# Patient Record
Sex: FEMALE | Race: ASIAN | Employment: FULL TIME | URBAN - METROPOLITAN AREA
[De-identification: names, ages, dates, MRNs, and addresses within clinical notes are randomized per-mention and may not be internally consistent; named-entity substitution may affect disease eponyms.]

---

## 2021-12-09 ENCOUNTER — HOSPITAL ENCOUNTER (EMERGENCY)
Age: 56
Discharge: HOME | End: 2021-12-09
Attending: STUDENT IN AN ORGANIZED HEALTH CARE EDUCATION/TRAINING PROGRAM
Payer: COMMERCIAL

## 2021-12-09 ENCOUNTER — APPOINTMENT (OUTPATIENT)
Dept: CT IMAGING | Age: 56
End: 2021-12-09
Attending: STUDENT IN AN ORGANIZED HEALTH CARE EDUCATION/TRAINING PROGRAM
Payer: COMMERCIAL

## 2021-12-09 VITALS
OXYGEN SATURATION: 98 % | DIASTOLIC BLOOD PRESSURE: 88 MMHG | TEMPERATURE: 98 F | RESPIRATION RATE: 18 BRPM | WEIGHT: 113 LBS | SYSTOLIC BLOOD PRESSURE: 142 MMHG | HEART RATE: 72 BPM

## 2021-12-09 DIAGNOSIS — R11.2 NON-INTRACTABLE VOMITING WITH NAUSEA, UNSPECIFIED VOMITING TYPE: ICD-10-CM

## 2021-12-09 DIAGNOSIS — R10.31 ABDOMINAL PAIN, RLQ: Primary | ICD-10-CM

## 2021-12-09 LAB
COMMENT, HOLDF: NORMAL
SAMPLES BEING HELD,HOLD: NORMAL

## 2021-12-09 PROCEDURE — 74011000636 HC RX REV CODE- 636: Performed by: STUDENT IN AN ORGANIZED HEALTH CARE EDUCATION/TRAINING PROGRAM

## 2021-12-09 PROCEDURE — 36415 COLL VENOUS BLD VENIPUNCTURE: CPT

## 2021-12-09 PROCEDURE — 74177 CT ABD & PELVIS W/CONTRAST: CPT

## 2021-12-09 PROCEDURE — 99283 EMERGENCY DEPT VISIT LOW MDM: CPT

## 2021-12-09 RX ORDER — NAPROXEN 500 MG/1
500 TABLET ORAL
Qty: 20 TABLET | Refills: 0 | Status: SHIPPED | OUTPATIENT
Start: 2021-12-09 | End: 2021-12-19

## 2021-12-09 RX ORDER — ONDANSETRON 4 MG/1
4 TABLET, ORALLY DISINTEGRATING ORAL
Qty: 20 TABLET | Refills: 0 | Status: SHIPPED | OUTPATIENT
Start: 2021-12-09

## 2021-12-09 RX ADMIN — IOPAMIDOL 100 ML: 755 INJECTION, SOLUTION INTRAVENOUS at 21:38

## 2021-12-09 NOTE — Clinical Note
[facility]  [address]  [address]  [phone]    Work/School Note    Date: 12/9/2021    To Whom It May concern:    Jelani Carlson was seen and treated today in the emergency room by the following provider(s):  Attending Provider: Parker Costa MD.      Jelani Carlson is excused from work/school on 12/09/21 and 12/10/21. She is medically clear to return to work/school on 12/11/2021. Sincerely,          Devan Echavarria MD

## 2021-12-10 NOTE — DISCHARGE INSTRUCTIONS
You presented to the ED from urgent care with right lower quadrant pain. Your lab work-up from urgent care did not show any acute or concerning findings. However your pain was consistent with appendicitis. A CT scan with contrast was obtained that showed no appendicitis, kidney stones, diverticulitis or other acute pathology. I recommend following up with your primary care physician, if you not have one use information discharge paperwork to obtain one.   Additionally if your pain continues please follow-up with gastroenterology

## 2021-12-10 NOTE — ED PROVIDER NOTES
Patient is a 35-year-old female who requires ASL presenting to the ED from urgent care with concern for right lower quadrant pain and appendicitis. Patient's lab work from urgent care reassuring. Kidney function within normal limits. CT scan of abdomen pelvis ordered with contrast.    Patient Dors is pain intermittently present for 30 years, patient did have some distant gynecologic problem that resolved and allowed her to have children afterwards. Patient had some nausea and felt poorly yesterday but all of the symptoms have resolved and she feels much better. Pain is now gone. Patient states she had normal bowel movements daily with no constipation. Denies dysuria. The history is provided by the patient, the spouse and medical records. The history is limited by a language barrier. A  was used. Abdominal Pain   This is a new problem. The current episode started 12 to 24 hours ago. The problem occurs constantly. The problem has been resolved. The pain is associated with an unknown factor. The pain is located in the RLQ. The quality of the pain is aching, pressure-like and sharp. The pain is at a severity of 6/10. The pain is moderate. Associated symptoms include nausea and vomiting. Pertinent negatives include no diarrhea, no constipation, no dysuria and no frequency. The pain is worsened by palpation. The pain is relieved by nothing. Past Medical History:   Diagnosis Date    Fallopian tube disorder     Kidney stones        History reviewed. No pertinent surgical history. History reviewed. No pertinent family history.     Social History     Socioeconomic History    Marital status: SINGLE     Spouse name: Not on file    Number of children: Not on file    Years of education: Not on file    Highest education level: Not on file   Occupational History    Not on file   Tobacco Use    Smoking status: Never Smoker    Smokeless tobacco: Never Used   Substance and Sexual Activity    Alcohol use: Never    Drug use: Never    Sexual activity: Not on file   Other Topics Concern    Not on file   Social History Narrative    Not on file     Social Determinants of Health     Financial Resource Strain:     Difficulty of Paying Living Expenses: Not on file   Food Insecurity:     Worried About Running Out of Food in the Last Year: Not on file    Yanelis of Food in the Last Year: Not on file   Transportation Needs:     Lack of Transportation (Medical): Not on file    Lack of Transportation (Non-Medical): Not on file   Physical Activity:     Days of Exercise per Week: Not on file    Minutes of Exercise per Session: Not on file   Stress:     Feeling of Stress : Not on file   Social Connections:     Frequency of Communication with Friends and Family: Not on file    Frequency of Social Gatherings with Friends and Family: Not on file    Attends [employer] Services: Not on file    Active Member of [address] [place] [address] [employer] or Organizations: Not on file    Attends Club or Organization Meetings: Not on file    Marital Status: Not on file   Intimate Partner Violence:     Fear of Current or Ex-Partner: Not on file    Emotionally Abused: Not on file    Physically Abused: Not on file    Sexually Abused: Not on file   Housing Stability:     Unable to Pay for Housing in the Last Year: Not on file    Number of [place] in the Last Year: Not on file    Unstable Housing in the Last Year: Not on file         ALLERGIES: Patient has no known allergies. Review of Systems   Constitutional: Negative. HENT: Negative. Eyes: Negative. Respiratory: Negative. Cardiovascular: Negative. Gastrointestinal: Positive for abdominal pain, nausea and vomiting. Negative for constipation and diarrhea. Endocrine: Negative. Genitourinary: Negative. Negative for dysuria and frequency. Musculoskeletal: Negative. Skin: Negative. Allergic/Immunologic: Negative. Neurological: Negative. Hematological: Negative. Psychiatric/Behavioral: Negative. Vitals:    12/09/21 2024   BP: (!) 152/105   Pulse: 68   Resp: 16   Temp: 97.8 °F (36.6 °C)   SpO2: 97%   Weight: 51.3 kg (113 lb)            Physical Exam  Vitals and nursing note reviewed. Constitutional:       General: She is not in acute distress. Appearance: Normal appearance. HENT:      Head: Normocephalic and atraumatic. Right Ear: External ear normal.      Left Ear: External ear normal.      Nose: Nose normal.   Eyes:      Extraocular Movements: Extraocular movements intact. Conjunctiva/sclera: Conjunctivae normal.   Cardiovascular:      Rate and Rhythm: Normal rate. Pulses: Normal pulses. Radial pulses are 2+ on the right side and 2+ on the left side. Heart sounds: Normal heart sounds. Pulmonary:      Effort: Pulmonary effort is normal.      Breath sounds: Normal breath sounds. Chest:      Chest wall: No deformity or swelling. Abdominal:      General: Abdomen is flat. Bowel sounds are normal. There is no distension. Palpations: Abdomen is soft. Tenderness: There is no abdominal tenderness. There is no right CVA tenderness, left CVA tenderness, guarding or rebound. Negative signs include Hardy's sign, Rovsing's sign and McBurney's sign. Hernia: No hernia is present. Musculoskeletal:         General: No deformity or signs of injury. Normal range of motion. Cervical back: Normal range of motion and neck supple. No tenderness. Skin:     General: Skin is warm and dry. Capillary Refill: Capillary refill takes less than 2 seconds. Neurological:      General: No focal deficit present. Mental Status: She is alert and oriented to person, place, and time.    Psychiatric:         Attention and Perception: Attention normal.         Mood and Affect: Mood normal.         Behavior: Behavior normal.          MDM     LABORATORY RESULTS:  Labs Reviewed   SAMPLES BEING HELD SAMPLES BEING HELD           IMAGING RESULTS:  CT ABD PELV W CONT   Final Result      1. No evidence of acute process in the abdomen or pelvis. Normal appendix. 2. Moderate volume of stool throughout the colon, query constipation. Distended   stomach full of debris. 3. Hepatic steatosis. 4. Additional findings as above. MEDICATIONS GIVEN:  Medications   iopamidoL (ISOVUE-370) 76 % injection 100 mL (100 mL IntraVENous Given 12/9/21 2138)       Differential diagnosis: Appendicitis, constipation, adnexal pathology, nausea vomiting    ED physician interpretation of laboratory results: Outpatient lab work unremarkable. Labs within normal limits. No lab work indicated because of patient's now resolved abdominal pain    MDM: Patient is a 57-year-old female presented to ED with acute right lower quadrant pain with unremarkable CT scan and lab work with resolved pain appropriate for discharge home and outpatient follow-up. Prescription for naproxen and Zofran given. Patient's vital signs are stable, patient is afebrile. Patient's physical exam is unremarkable. Key discharge instructions and summary of care: You presented to the ED from urgent care with right lower quadrant pain. Your lab work-up from urgent care did not show any acute or concerning findings. However your pain was consistent with appendicitis. A CT scan with contrast was obtained that showed no appendicitis, kidney stones, diverticulitis or other acute pathology. I recommend following up with your primary care physician, if you not have one use information discharge paperwork to obtain one. Additionally if your pain continues please follow-up with gastroenterology    The patient has been re-evaluated and feeling better. Patient is stable for discharge. All available radiology and laboratory results have been reviewed with patient and/or available family.   Patient and/or family verbally conveyed their understanding and agreement of the patient's signs, symptoms, diagnosis, treatment and prognosis and additionally agree to follow-up as recommended in the discharge instructions or to return to the Emergency Department should their condition change or worsen prior to their follow-up appointment. All questions have been answered and patient and/or available family express understanding. IMPRESSION:  1. Abdominal pain, RLQ    2. Non-intractable vomiting with nausea, unspecified vomiting type        DISPOSITION: Discharged    René Edgar MD        Procedures

## 2021-12-10 NOTE — ED TRIAGE NOTES
Pt c/o RLQ pain x 30 years and worse the last 7 years with no diagnosis. Pt states that pain makes her dizzy and nausea. Pt states that she had an xray and bloodwork done today at [facility]. Pt has hx of kidney stones, but none currently. Pt also has hx of issues with her right ovary.

## 2023-05-15 RX ORDER — ONDANSETRON 4 MG/1
4 TABLET, ORALLY DISINTEGRATING ORAL EVERY 8 HOURS PRN
COMMUNITY
Start: 2021-12-09

## 2024-03-19 ENCOUNTER — NURSE TRIAGE (OUTPATIENT)
Dept: OTHER | Facility: CLINIC | Age: 59
End: 2024-03-19

## 2024-03-19 NOTE — TELEPHONE ENCOUNTER
[place]    Received call from Marcy[place] at [facility]; Patient with Red Flag Complaint requesting to establish care with Riggschriss Burns. Patient states she had a new patient appointment scheduled for June but received a call that it was gong to be pushed back until August.     Call transferred with a  already on the line ID #[id number]    Subjective: Caller states \"high blood pressure\"     Current Symptoms: /109 this morning, during the call 152/100. Patient states SBP is sometimes above 200.Right arm tingling beginning 1 month ago.     Onset: 1 month ago;     Pain Severity: 0/10;     Temperature: denies    What has been tried: nothing    LMP: Pregnant: No    Recommended disposition: Go to ED Now    Care advice provided, patient verbalizes understanding; denies any other questions or concerns; instructed to call back for any new or worsening symptoms.    Patient declines ED disposition and wants to establish with PCP. Disposition reinforced. Unable to transfer patient to [facility] for scheduling, currently closed. Patient instructed to call back first thing in the morning to schedule her appointment and inform  she has already spoke with a Triage RN.     Attention Provider:  Thank you for allowing me to participate in the care of your patient.  The patient was connected to triage in response to information provided to the [facility].  Please do not respond through this encounter as the response is not directed to a shared pool.      Reason for Disposition   [1] Systolic BP  >= 160 OR Diastolic >= 100 AND [2] cardiac (e.g., breathing difficulty, chest pain) or neurologic symptoms (e.g., new-onset blurred or double vision, unsteady gait)    Protocols used: Blood Pressure - High-ADULT-

## 2024-07-11 ENCOUNTER — OFFICE VISIT (OUTPATIENT)
Age: 59
End: 2024-07-11
Payer: COMMERCIAL

## 2024-07-11 VITALS
OXYGEN SATURATION: 98 % | TEMPERATURE: 97.8 F | HEIGHT: 62 IN | BODY MASS INDEX: 20.61 KG/M2 | WEIGHT: 112 LBS | DIASTOLIC BLOOD PRESSURE: 84 MMHG | SYSTOLIC BLOOD PRESSURE: 150 MMHG | HEART RATE: 61 BPM

## 2024-07-11 DIAGNOSIS — Z11.59 ENCOUNTER FOR HEPATITIS C SCREENING TEST FOR LOW RISK PATIENT: ICD-10-CM

## 2024-07-11 DIAGNOSIS — G89.29 CHRONIC RIGHT SHOULDER PAIN: ICD-10-CM

## 2024-07-11 DIAGNOSIS — Z12.12 SCREENING FOR COLORECTAL CANCER: ICD-10-CM

## 2024-07-11 DIAGNOSIS — Z13.220 SCREENING CHOLESTEROL LEVEL: ICD-10-CM

## 2024-07-11 DIAGNOSIS — Z11.4 SCREENING FOR HIV (HUMAN IMMUNODEFICIENCY VIRUS): ICD-10-CM

## 2024-07-11 DIAGNOSIS — Z12.31 ENCOUNTER FOR SCREENING MAMMOGRAM FOR BREAST CANCER: ICD-10-CM

## 2024-07-11 DIAGNOSIS — I10 ESSENTIAL HYPERTENSION: ICD-10-CM

## 2024-07-11 DIAGNOSIS — E04.1 THYROID NODULE: ICD-10-CM

## 2024-07-11 DIAGNOSIS — Z12.4 CERVICAL CANCER SCREENING: ICD-10-CM

## 2024-07-11 DIAGNOSIS — M25.511 CHRONIC RIGHT SHOULDER PAIN: ICD-10-CM

## 2024-07-11 DIAGNOSIS — R01.1 MURMUR, CARDIAC: ICD-10-CM

## 2024-07-11 DIAGNOSIS — Z13.1 SCREENING FOR DIABETES MELLITUS: ICD-10-CM

## 2024-07-11 DIAGNOSIS — Z12.11 SCREENING FOR COLORECTAL CANCER: ICD-10-CM

## 2024-07-11 DIAGNOSIS — Z76.89 ESTABLISHING CARE WITH NEW DOCTOR, ENCOUNTER FOR: Primary | ICD-10-CM

## 2024-07-11 DIAGNOSIS — H91.93 BILATERAL DEAFNESS: ICD-10-CM

## 2024-07-11 PROBLEM — R20.0 NUMBNESS OF UPPER LIMB: Status: RESOLVED | Noted: 2024-03-20 | Resolved: 2024-07-11

## 2024-07-11 PROBLEM — R20.0 NUMBNESS OF LOWER LIMB: Status: RESOLVED | Noted: 2024-03-20 | Resolved: 2024-07-11

## 2024-07-11 PROCEDURE — 3079F DIAST BP 80-89 MM HG: CPT | Performed by: STUDENT IN AN ORGANIZED HEALTH CARE EDUCATION/TRAINING PROGRAM

## 2024-07-11 PROCEDURE — 3077F SYST BP >= 140 MM HG: CPT | Performed by: STUDENT IN AN ORGANIZED HEALTH CARE EDUCATION/TRAINING PROGRAM

## 2024-07-11 PROCEDURE — 99204 OFFICE O/P NEW MOD 45 MIN: CPT | Performed by: STUDENT IN AN ORGANIZED HEALTH CARE EDUCATION/TRAINING PROGRAM

## 2024-07-11 RX ORDER — LISINOPRIL 10 MG/1
10 TABLET ORAL DAILY
COMMUNITY
Start: 2024-03-20 | End: 2024-07-11 | Stop reason: SINTOL

## 2024-07-11 RX ORDER — AMLODIPINE BESYLATE 5 MG/1
5 TABLET ORAL DAILY
Qty: 90 TABLET | Refills: 0 | Status: SHIPPED | OUTPATIENT
Start: 2024-07-11

## 2024-07-11 SDOH — ECONOMIC STABILITY: FOOD INSECURITY: WITHIN THE PAST 12 MONTHS, YOU WORRIED THAT YOUR FOOD WOULD RUN OUT BEFORE YOU GOT MONEY TO BUY MORE.: NEVER TRUE

## 2024-07-11 SDOH — ECONOMIC STABILITY: HOUSING INSECURITY
IN THE LAST 12 MONTHS, WAS THERE A TIME WHEN YOU DID NOT HAVE A STEADY PLACE TO SLEEP OR SLEPT IN A SHELTER (INCLUDING NOW)?: NO

## 2024-07-11 SDOH — ECONOMIC STABILITY: INCOME INSECURITY: HOW HARD IS IT FOR YOU TO PAY FOR THE VERY BASICS LIKE FOOD, HOUSING, MEDICAL CARE, AND HEATING?: NOT HARD AT ALL

## 2024-07-11 SDOH — ECONOMIC STABILITY: FOOD INSECURITY: WITHIN THE PAST 12 MONTHS, THE FOOD YOU BOUGHT JUST DIDN'T LAST AND YOU DIDN'T HAVE MONEY TO GET MORE.: NEVER TRUE

## 2024-07-11 ASSESSMENT — PATIENT HEALTH QUESTIONNAIRE - PHQ9
SUM OF ALL RESPONSES TO PHQ9 QUESTIONS 1 & 2: 0
SUM OF ALL RESPONSES TO PHQ QUESTIONS 1-9: 0
2. FEELING DOWN, DEPRESSED OR HOPELESS: NOT AT ALL
1. LITTLE INTEREST OR PLEASURE IN DOING THINGS: NOT AT ALL
SUM OF ALL RESPONSES TO PHQ QUESTIONS 1-9: 0

## 2024-07-11 ASSESSMENT — ENCOUNTER SYMPTOMS
SHORTNESS OF BREATH: 0
BLOOD IN STOOL: 0
ABDOMINAL PAIN: 0
DIARRHEA: 0
CONSTIPATION: 0
COUGH: 0
NAUSEA: 0
VOMITING: 0

## 2024-07-11 NOTE — PROGRESS NOTES
RM14    Chief Complaint   Patient presents with    New Patient     Pt stated she has a history of high blood pressure and she was in ER,she also stated she has a Rt shoulder pain for a while .         \"Have you been to the ER, urgent care clinic since your last visit?  Hospitalized since your last visit?\"    NO    “Have you seen or consulted any other health care providers outside of [facility] since your last visit?”    NO    Have you had a mammogram?”   NO    No breast cancer screening on file      “Have you had a pap smear?”    NO    No cervical cancer screening on file         “Have you had a colorectal cancer screening such as a colonoscopy/FIT/Cologuard?    NO    No colonoscopy on file  No cologuard on file  No FIT/FOBT on file   No flexible sigmoidoscopy on file         Click Here for Release of Records Request   AVS  education, follow up, and recommendations provided and addressed with patient.  services used to advise patient yes    
Future  -     Comprehensive Metabolic Panel; Future  -     Lipid Panel; Future  -     Hemoglobin A1C; Future  -     Thyroid Cascade Profile; Future  -     Magnesium; Future  3. Murmur, cardiac  -     Echo (TTE) complete (PRN contrast/bubble/strain/3D); Future  -     [facility] - Serge Steen MD, Interventional Cardiology, [place] ([facility])  4. Thyroid nodule  -     US HEAD NECK SOFT TISSUE THYROID; Future  5. Chronic right shoulder pain  6. Bilateral deafness  7. Screening for diabetes mellitus  -     Hemoglobin A1C; Future  8. Screening cholesterol level  -     Lipid Panel; Future  9. Screening for HIV (human immunodeficiency virus)  -     HIV 1/2 Ag/Ab, 4TH Generation,W Rflx Confirm; Future  10. Encounter for hepatitis C screening test for low risk patient  -     Hepatitis C Ab, Rflx to Qt by PCR; Future  11. Encounter for screening mammogram for breast cancer  -     Cologuard (Fecal DNA Colorectal Cancer Screening)  12. Screening for colorectal cancer  -     EMILIANO DIGITAL SCREEN W OR WO CAD BILATERAL; Future  13. Cervical cancer screening  -     [facility] - Brenda Tafoya MD, Ob-Gyn, [place]  ([facility])         Patient presents for establish care visit with me.  Acute concerns addressed.  Chronic problems reviewed.  Medications and history reviewed.  See below for additional information.  Hypertension: Chronic and uncontrolled. Patient is not on medication. We will start Amlodipine 5 mg daily. No longer use lisinopril due to cough. Patient is due for labs. Will check labs today (see above). Follow-up in 1 month.  Cardiac murmur: Chronic, unknown cause. Will order for echocardiogram to evaluate  Thyroid nodule: Chronic and unknown status. Will order for US head/neck to evaluate  Chronic R shoulder pain: Uncontrolled - encouraged patient to reach out to orthopedic doctor, neurologist and PT/OT per ER discharge papework  Bilateral deafness: Chronic and stable - ASL  used for duration of visit  Will screen

## 2024-07-12 LAB
ALBUMIN SERPL-MCNC: 4.5 G/DL (ref 3.8–4.9)
ALP SERPL-CCNC: 97 IU/L (ref 44–121)
ALT SERPL-CCNC: 20 IU/L (ref 0–32)
AST SERPL-CCNC: 23 IU/L (ref 0–40)
BASOPHILS # BLD AUTO: 0 X10E3/UL (ref 0–0.2)
BASOPHILS NFR BLD AUTO: 1 %
BILIRUB SERPL-MCNC: 1 MG/DL (ref 0–1.2)
BUN SERPL-MCNC: 21 MG/DL (ref 6–24)
BUN/CREAT SERPL: 27 (ref 9–23)
CALCIUM SERPL-MCNC: 9.2 MG/DL (ref 8.7–10.2)
CHLORIDE SERPL-SCNC: 103 MMOL/L (ref 96–106)
CHOLEST SERPL-MCNC: 212 MG/DL (ref 100–199)
CO2 SERPL-SCNC: 28 MMOL/L (ref 20–29)
CREAT SERPL-MCNC: 0.79 MG/DL (ref 0.57–1)
EOSINOPHIL # BLD AUTO: 0.1 X10E3/UL (ref 0–0.4)
EOSINOPHIL NFR BLD AUTO: 2 %
ERYTHROCYTE [DISTWIDTH] IN BLOOD BY AUTOMATED COUNT: 11.9 % (ref 11.7–15.4)
GLOBULIN SER CALC-MCNC: 2.5 G/DL (ref 1.5–4.5)
GLUCOSE SERPL-MCNC: 81 MG/DL (ref 70–99)
HBA1C MFR BLD: 5.3 % (ref 4.8–5.6)
HCT VFR BLD AUTO: 39.7 % (ref 34–46.6)
HCV AB SERPL QL IA: NORMAL
HCV IGG SERPL QL IA: NON REACTIVE
HDLC SERPL-MCNC: 73 MG/DL
HGB BLD-MCNC: 12.8 G/DL (ref 11.1–15.9)
HIV 1+2 AB+HIV1 P24 AG SERPL QL IA: NON REACTIVE
IMM GRANULOCYTES # BLD AUTO: 0 X10E3/UL (ref 0–0.1)
IMM GRANULOCYTES NFR BLD AUTO: 0 %
LDLC SERPL CALC-MCNC: 116 MG/DL (ref 0–99)
LYMPHOCYTES # BLD AUTO: 1.4 X10E3/UL (ref 0.7–3.1)
LYMPHOCYTES NFR BLD AUTO: 36 %
MAGNESIUM SERPL-MCNC: 2.3 MG/DL (ref 1.6–2.3)
MCH RBC QN AUTO: 29.8 PG (ref 26.6–33)
MCHC RBC AUTO-ENTMCNC: 32.2 G/DL (ref 31.5–35.7)
MCV RBC AUTO: 92 FL (ref 79–97)
MONOCYTES # BLD AUTO: 0.3 X10E3/UL (ref 0.1–0.9)
MONOCYTES NFR BLD AUTO: 7 %
NEUTROPHILS # BLD AUTO: 2.1 X10E3/UL (ref 1.4–7)
NEUTROPHILS NFR BLD AUTO: 54 %
PLATELET # BLD AUTO: 188 X10E3/UL (ref 150–450)
POTASSIUM SERPL-SCNC: 3.7 MMOL/L (ref 3.5–5.2)
PROT SERPL-MCNC: 7 G/DL (ref 6–8.5)
RBC # BLD AUTO: 4.3 X10E6/UL (ref 3.77–5.28)
SODIUM SERPL-SCNC: 143 MMOL/L (ref 134–144)
TRIGL SERPL-MCNC: 133 MG/DL (ref 0–149)
TSH SERPL DL<=0.005 MIU/L-ACNC: 1.73 UIU/ML (ref 0.45–4.5)
VLDLC SERPL CALC-MCNC: 23 MG/DL (ref 5–40)
WBC # BLD AUTO: 3.9 X10E3/UL (ref 3.4–10.8)

## 2024-07-17 ENCOUNTER — TELEPHONE (OUTPATIENT)
Age: 59
End: 2024-07-17

## 2024-07-17 NOTE — TELEPHONE ENCOUNTER
Left a detailed message pertain to the provider referral and provided patient with a call back number

## 2024-08-14 ENCOUNTER — TELEPHONE (OUTPATIENT)
Age: 59
End: 2024-08-14

## 2024-08-14 NOTE — TELEPHONE ENCOUNTER
Called patient to schedule a new patient appointment from the referral received from the patients pcp, left  for the patient to call back.

## 2024-08-15 ENCOUNTER — TELEPHONE (OUTPATIENT)
Age: 59
End: 2024-08-15

## 2024-08-15 NOTE — TELEPHONE ENCOUNTER
Called patient to schedule a new patient appointment with Dr. Steen, voicemail left to return the call.  Appointment can be scheduled by any .     [place]

## 2024-09-23 LAB — NONINV COLON CA DNA+OCC BLD SCRN STL QL: NEGATIVE

## 2024-10-08 ENCOUNTER — OFFICE VISIT (OUTPATIENT)
Age: 59
End: 2024-10-08
Payer: COMMERCIAL

## 2024-10-08 VITALS
HEIGHT: 62 IN | OXYGEN SATURATION: 98 % | SYSTOLIC BLOOD PRESSURE: 112 MMHG | HEART RATE: 54 BPM | WEIGHT: 115 LBS | BODY MASS INDEX: 21.16 KG/M2 | DIASTOLIC BLOOD PRESSURE: 70 MMHG

## 2024-10-08 DIAGNOSIS — H91.93 BILATERAL DEAFNESS: ICD-10-CM

## 2024-10-08 DIAGNOSIS — R01.1 UNDIAGNOSED CARDIAC MURMURS: Primary | ICD-10-CM

## 2024-10-08 DIAGNOSIS — M25.511 CHRONIC RIGHT SHOULDER PAIN: ICD-10-CM

## 2024-10-08 DIAGNOSIS — I49.3 FREQUENT PVCS: ICD-10-CM

## 2024-10-08 DIAGNOSIS — G89.29 CHRONIC RIGHT SHOULDER PAIN: ICD-10-CM

## 2024-10-08 DIAGNOSIS — E04.1 THYROID NODULE: ICD-10-CM

## 2024-10-08 DIAGNOSIS — R00.2 PALPITATIONS: ICD-10-CM

## 2024-10-08 DIAGNOSIS — I10 ESSENTIAL HYPERTENSION: ICD-10-CM

## 2024-10-08 DIAGNOSIS — Z82.49 FAMILY HISTORY OF ISCHEMIC HEART DISEASE (IHD): ICD-10-CM

## 2024-10-08 PROCEDURE — 99204 OFFICE O/P NEW MOD 45 MIN: CPT | Performed by: INTERNAL MEDICINE

## 2024-10-08 PROCEDURE — 93000 ELECTROCARDIOGRAM COMPLETE: CPT | Performed by: INTERNAL MEDICINE

## 2024-10-08 PROCEDURE — 3074F SYST BP LT 130 MM HG: CPT | Performed by: INTERNAL MEDICINE

## 2024-10-08 PROCEDURE — 3078F DIAST BP <80 MM HG: CPT | Performed by: INTERNAL MEDICINE

## 2024-10-08 ASSESSMENT — PATIENT HEALTH QUESTIONNAIRE - PHQ9
SUM OF ALL RESPONSES TO PHQ QUESTIONS 1-9: 0
1. LITTLE INTEREST OR PLEASURE IN DOING THINGS: NOT AT ALL
2. FEELING DOWN, DEPRESSED OR HOPELESS: NOT AT ALL
SUM OF ALL RESPONSES TO PHQ9 QUESTIONS 1 & 2: 0
SUM OF ALL RESPONSES TO PHQ QUESTIONS 1-9: 0

## 2024-10-08 NOTE — PROGRESS NOTES
[address] [place] [address] [phone]    [address], [place] [address]     Subjective:        Mónica Salmon is a 58 y.o. female is here for a new patient visit, with heart murmur noted by PCP on new patient visit 2024.  Language line was used for duration of this visit.  Patient's preferred language: ASL    She states that she works at [employer] and while she was on her feet yesterday she felt hot all over felt very short of breath had some headache,, heart palpitations/pounding, and just wanted to go home.    No other episodes like that before or since.    She does not do any formal exercise but normally she can take a hike or go up several flights of stairs and other exertional activities without any problem.    Her mother has some sort of heart problem, she does not know what kind of heart problem but she is still on medicine for it.  Her father had a stroke.  He has largely recovered and he is doing okay at age 95.    The patient denies chest pain/ shortness of breath, orthopnea, PND, LE edema, palpitations, syncope, presyncope or fatigue.    Patient Active Problem List    Diagnosis Date Noted    Bilateral deafness 2024    Murmur, cardiac 2024    Essential hypertension 2024    Chronic right shoulder pain 2024    Thyroid nodule 2024      Aziza Borrego MD  Past Medical History:   Diagnosis Date    Fallopian tube disorder     Hypertension     Kidney stones     Numbness of lower limb 2024    Numbness of upper limb 2024      Past Surgical History:   Procedure Laterality Date     SECTION       No Known Allergies   Family History   Problem Relation Age of Onset    Hypertension Mother     Stroke Father     Hypertension Sister       Social History     Socioeconomic History    Marital status: Single     Spouse name: Not on file    Number of children: Not on file    Years of education: Not on file    Highest education level:

## 2024-10-09 ENCOUNTER — TELEPHONE (OUTPATIENT)
Age: 59
End: 2024-10-09

## 2024-10-09 NOTE — TELEPHONE ENCOUNTER
Enrolled with [employer] - Ordered and being shipped to patient's home address on file.  ETA within 5-7 business days.        Message  Received: Yesterday  Paris Bonner LPN Hughes, Carrie  Please order a 3 days E. Holter for palpitations for above patient per dr. Epps.

## 2024-10-11 ENCOUNTER — PATIENT MESSAGE (OUTPATIENT)
Age: 59
End: 2024-10-11

## 2024-10-11 RX ORDER — AMLODIPINE BESYLATE 5 MG/1
5 TABLET ORAL DAILY
Qty: 30 TABLET | Refills: 0 | Status: SHIPPED | OUTPATIENT
Start: 2024-10-11

## 2024-10-11 NOTE — TELEPHONE ENCOUNTER
Future Appointments:  Future Appointments   Date Time Provider Department Center   11/13/2024  1:00 PM [facility]   1/14/2025  9:40 AM Serge Steen MD [facility]        Last Appointment With Me:  7/11/2024     Requested Prescriptions     Signed Prescriptions Disp Refills    amLODIPine (NORVASC) 5 MG tablet 30 tablet 0     Sig: Take 1 tablet by mouth daily     Authorizing Provider: CHRISTY RENEE     Ordering User: MARIANO SALGADO

## 2024-11-06 ENCOUNTER — TELEPHONE (OUTPATIENT)
Age: 59
End: 2024-11-06

## 2024-11-11 ENCOUNTER — TELEPHONE (OUTPATIENT)
Age: 59
End: 2024-11-11

## 2024-11-11 RX ORDER — METOPROLOL SUCCINATE 25 MG/1
25 TABLET, EXTENDED RELEASE ORAL DAILY
Qty: 90 TABLET | Refills: 3 | Status: SHIPPED | OUTPATIENT
Start: 2024-11-11

## 2024-11-11 NOTE — TELEPHONE ENCOUNTER
----- Message from Dr. Serge Steen MD sent at 11/3/2024  8:34 PM EST -----  Please advise heart monitor shows frequent early heartbeats from the bottom chamber of the heart called premature ventricular contractions.  These are not dangerous.  They are not occurring frequently enough that it is likely to cause a problem.  Awaiting other testing.  In the meantime I recommend we stop the amlodipine and start metoprolol 25 mg daily to help suppress these early heartbeats and also help treat the blood pressure.  Monitor blood pressure and pulse, write them down and bring to appointments.  Follow-up as scheduled.      This nurse called patient's home. Spoke with the  Id # [id number]. All MD read and  will submit to patient.  Also informed that Amlodipine was discontinued and Metoprolol succinate was sent to pharmacy on file.

## 2024-11-19 ENCOUNTER — TELEPHONE (OUTPATIENT)
Age: 59
End: 2024-11-19

## 2024-11-19 NOTE — TELEPHONE ENCOUNTER
----- Message from Dr. Serge Steen MD sent at 11/18/2024  9:08 PM EST -----  Please advise echo shows normal heart function with no significant valve problems.  Follow-up as scheduled.     Future Appointments  11/26/2024 10:45 AM   Aziza Borrego MD            [facility]  1/14/2025  9:40 AM    Serge Steen MD [facility]              [facility]     My Chart message sent to patient.

## 2024-11-27 NOTE — TELEPHONE ENCOUNTER
My chart message sent to patient with results and recommendations. Medication previously sent to pharmacy.  Amlodipine Dc'd.

## 2025-01-14 ENCOUNTER — OFFICE VISIT (OUTPATIENT)
Age: 60
End: 2025-01-14
Payer: COMMERCIAL

## 2025-01-14 VITALS
HEIGHT: 62 IN | DIASTOLIC BLOOD PRESSURE: 82 MMHG | OXYGEN SATURATION: 96 % | RESPIRATION RATE: 16 BRPM | HEART RATE: 52 BPM | SYSTOLIC BLOOD PRESSURE: 110 MMHG | WEIGHT: 115 LBS | BODY MASS INDEX: 21.16 KG/M2

## 2025-01-14 DIAGNOSIS — R00.2 PALPITATIONS: ICD-10-CM

## 2025-01-14 DIAGNOSIS — R01.1 UNDIAGNOSED CARDIAC MURMURS: ICD-10-CM

## 2025-01-14 DIAGNOSIS — Z82.49 FAMILY HISTORY OF ISCHEMIC HEART DISEASE (IHD): ICD-10-CM

## 2025-01-14 DIAGNOSIS — I10 ESSENTIAL HYPERTENSION: Primary | ICD-10-CM

## 2025-01-14 DIAGNOSIS — R01.1 MURMUR, CARDIAC: ICD-10-CM

## 2025-01-14 DIAGNOSIS — I49.3 FREQUENT PVCS: ICD-10-CM

## 2025-01-14 PROCEDURE — 3079F DIAST BP 80-89 MM HG: CPT | Performed by: INTERNAL MEDICINE

## 2025-01-14 PROCEDURE — 93000 ELECTROCARDIOGRAM COMPLETE: CPT | Performed by: INTERNAL MEDICINE

## 2025-01-14 PROCEDURE — 3074F SYST BP LT 130 MM HG: CPT | Performed by: INTERNAL MEDICINE

## 2025-01-14 PROCEDURE — 99214 OFFICE O/P EST MOD 30 MIN: CPT | Performed by: INTERNAL MEDICINE

## 2025-01-14 ASSESSMENT — PATIENT HEALTH QUESTIONNAIRE - PHQ9
2. FEELING DOWN, DEPRESSED OR HOPELESS: NOT AT ALL
1. LITTLE INTEREST OR PLEASURE IN DOING THINGS: NOT AT ALL
SUM OF ALL RESPONSES TO PHQ QUESTIONS 1-9: 0
SUM OF ALL RESPONSES TO PHQ QUESTIONS 1-9: 0
SUM OF ALL RESPONSES TO PHQ9 QUESTIONS 1 & 2: 0
SUM OF ALL RESPONSES TO PHQ QUESTIONS 1-9: 0
SUM OF ALL RESPONSES TO PHQ QUESTIONS 1-9: 0

## 2025-01-14 NOTE — PROGRESS NOTES
encouraged patient to reach out to orthopedic doctor, neurologist and PT/OT per ER discharge papework     Bilateral deafness: Chronic and stable - ASL  used for duration of visit     FYI for communication of test results:  -Patient prefers video phone calls on Thursdays if at all possible  -I advised patient for significantly abnormal results we would keep calling until we got her, and that we may discuss test results in person with ASL  at office visits.    Follow up in 1 year, sooner PRN.     Serge Steen MD

## 2025-01-22 ENCOUNTER — HOSPITAL ENCOUNTER (OUTPATIENT)
Facility: HOSPITAL | Age: 60
Discharge: HOME | End: 2025-01-25
Attending: INTERNAL MEDICINE

## 2025-01-22 DIAGNOSIS — R00.2 PALPITATIONS: ICD-10-CM

## 2025-01-22 DIAGNOSIS — I10 ESSENTIAL HYPERTENSION: ICD-10-CM

## 2025-01-22 PROCEDURE — 75571 CT HRT W/O DYE W/CA TEST: CPT

## 2025-01-27 ENCOUNTER — TELEPHONE (OUTPATIENT)
Age: 60
End: 2025-01-27

## 2025-01-27 NOTE — TELEPHONE ENCOUNTER
----- Message from Dr. Serge Steen MD sent at 1/26/2025  8:08 PM EST -----  Please advise the patient that his/ her coronary calcium score is 0.  This means that there is no cholesterol plaque detected in the arteries of the heart and that the risk of cardiovascular events is very low.  Good news.  Proceed with healthy diet and exercise to prevent the formation of cholesterol in the arteries in the future.     Future Appointments  1/20/2026  10:40 AM   Serge Steen MD [facility]     My Chart message sent.